# Patient Record
Sex: MALE | Race: WHITE | NOT HISPANIC OR LATINO | Employment: FULL TIME | URBAN - METROPOLITAN AREA
[De-identification: names, ages, dates, MRNs, and addresses within clinical notes are randomized per-mention and may not be internally consistent; named-entity substitution may affect disease eponyms.]

---

## 2017-08-03 ENCOUNTER — NON-PROVIDER VISIT (OUTPATIENT)
Dept: URGENT CARE | Facility: CLINIC | Age: 27
End: 2017-08-03

## 2017-08-03 DIAGNOSIS — Z02.83 ENCOUNTER FOR EMPLOYMENT-RELATED DRUG TESTING: ICD-10-CM

## 2017-08-03 LAB
BREATH ALCOHOL COMMENT: 0.03
POC BREATHALIZER: NORMAL PERCENT (ref 0–0.01)

## 2017-08-03 PROCEDURE — 82075 ASSAY OF BREATH ETHANOL: CPT | Performed by: NURSE PRACTITIONER

## 2017-08-03 NOTE — MR AVS SNAPSHOT
Simone Euceda   8/3/2017 10:45 AM   Appointment   MRN: [id number]    Department:  [facility]   Dept Phone:  [phone]    Description:  Male : 1990   Provider:  [facility]           Allergies as of 8/3/2017     Allergen Noted Reactions    Augmentin 2009       erythemia multiform      Vital Signs     Smoking Status                   Former Smoker           Basic Information     Date Of Birth Sex Race Ethnicity Preferred Language    1990 Male White Non- English      Problem List              ICD-10-CM Priority Class Noted - Resolved    Pre-hypertension R03.0   2014 - Present    Smoking F17.200   2014 - Present    Hemoptysis R04.2   2015 - Present    Tobacco use Z72.0   2015 - Present    ETOH abuse F10.10   2015 - Present    Pneumonia due to organism J18.9   2015 - Present    Bleeding of the respiratory tract R04.9   10/6/2015 - Present      Health Maintenance        Date Due Completion Dates    IMM DTaP/Tdap/Td Vaccine (1 - Tdap) 4/3/2008 ---    IMM PNEUMOCOCCAL 19-64 (ADULT) MEDIUM RISK SERIES (1 of 1 - PPSV23) 4/3/2008 ---    IMM INFLUENZA (1) 2017 ---            Current Immunizations     No immunizations on file.      Below and/or attached are the medications your provider expects you to take. Review all of your home medications and newly ordered medications with your provider and/or pharmacist. Follow medication instructions as directed by your provider and/or pharmacist. Please keep your medication list with you and share with your provider. Update the information when medications are discontinued, doses are changed, or new medications (including over-the-counter products) are added; and carry medication information at all times in the event of emergency situations     Allergies:  AUGMENTIN - (reactions not documented)               Medications  Valid as of: 2017 - 12:21 PM    Generic Name Brand Name Tablet Size  Instructions for use    LevoFLOXacin (Tab) LEVAQUIN 750 MG Take 1 Tab by mouth every day.        .                 Medicines prescribed today were sent to:     [id number] [place], [place] - [address] [facility]    [address] [place] [address] [place] [address]    Phone: [phone] Fax: [phone]    Open 24 Hours?: No      Medication refill instructions:       If your prescription bottle indicates you have medication refills left, it is not necessary to call your provider’s office. Please contact your pharmacy and they will refill your medication.    If your prescription bottle indicates you do not have any refills left, you may request refills at any time through one of the following ways: The online [employer] system (except Urgent Care), by calling your provider’s office, or by asking your pharmacy to contact your provider’s office with a refill request. Medication refills are processed only during regular business hours and may not be available until the next business day. Your provider may request additional information or to have a follow-up visit with you prior to refilling your medication.   *Please Note: Medication refills are assigned a new Rx number when refilled electronically. Your pharmacy may indicate that no refills were authorized even though a new prescription for the same medication is available at the pharmacy. Please request the medicine by name with the pharmacy before contacting your provider for a refill.           [employer] Access Code: [email or web address]  Expires: 9/2/2017 12:21 PM    [employer]  A secure, online tool to manage your health information     [employer]® is a secure, online tool that connects you to your personalized health information from the privacy of your home -- day or night - making it very easy for you to manage your healthcare. Once the activation process is completed, you can even access your medical information using the [employer] angel, which is available for free in the Apple  Leanna store or Google Play store.     [employer] provides the following levels of access (as shown below):   My Chart Features   [facility] Doctor [facility]  Specialists [facility]  Urgent  Care Non-[facility]  Primary Care  Doctor   Email your healthcare team securely and privately 24/7 X X X    Manage appointments: schedule your next appointment; view details of past/upcoming appointments X      Request prescription refills. X      View recent personal medical records, including lab and immunizations X X X X   View health record, including health history, allergies, medications X X X X   Read reports about your outpatient visits, procedures, consult and ER notes X X X X   See your discharge summary, which is a recap of your hospital and/or ER visit that includes your diagnosis, lab results, and care plan. X X       How to register for [employer]:  1. Go to  [email or web address].  2. Click on the Sign Up Now box, which takes you to the New Member Sign Up page. You will need to provide the following information:  a. Enter your [employer] Access Code exactly as it appears at the top of this page. (You will not need to use this code after you’ve completed the sign-up process. If you do not sign up before the expiration date, you must request a new code.)   b. Enter your date of birth.   c. Enter your home email address.   d. Click Submit, and follow the next screen’s instructions.  3. Create a [employer] ID. This will be your [employer] login ID and cannot be changed, so think of one that is secure and easy to remember.  4. Create a [employer] password. You can change your password at any time.  5. Enter your Password Reset Question and Answer. This can be used at a later time if you forget your password.   6. Enter your e-mail address. This allows you to receive e-mail notifications when new information is available in [employer].  7. Click Sign Up. You can now view your health information.    For assistance activating your [employer]  account, call [phone]

## 2017-08-18 ENCOUNTER — NON-PROVIDER VISIT (OUTPATIENT)
Dept: URGENT CARE | Facility: CLINIC | Age: 27
End: 2017-08-18

## 2017-08-18 DIAGNOSIS — Z02.1 PRE-EMPLOYMENT DRUG TESTING: ICD-10-CM

## 2017-08-18 LAB
AMP AMPHETAMINE: NORMAL
COC COCAINE: NORMAL
INT CON NEG: NEGATIVE
INT CON POS: POSITIVE
OPI OPIATES: NORMAL
PCP PHENCYCLIDINE: NORMAL
POC DRUG COMMENT 753798-OCCUPATIONAL HEALTH: NORMAL
THC: NORMAL

## 2017-08-18 PROCEDURE — 80305 DRUG TEST PRSMV DIR OPT OBS: CPT | Performed by: PHYSICIAN ASSISTANT

## 2017-09-22 ENCOUNTER — OFFICE VISIT (OUTPATIENT)
Dept: URGENT CARE | Facility: CLINIC | Age: 27
End: 2017-09-22
Payer: COMMERCIAL

## 2017-09-22 VITALS
TEMPERATURE: 98.5 F | DIASTOLIC BLOOD PRESSURE: 96 MMHG | BODY MASS INDEX: 23.48 KG/M2 | OXYGEN SATURATION: 98 % | WEIGHT: 164 LBS | HEIGHT: 70 IN | SYSTOLIC BLOOD PRESSURE: 122 MMHG | RESPIRATION RATE: 16 BRPM | HEART RATE: 126 BPM

## 2017-09-22 DIAGNOSIS — M10.9 ACUTE GOUT INVOLVING TOE OF RIGHT FOOT, UNSPECIFIED CAUSE: ICD-10-CM

## 2017-09-22 PROCEDURE — 99203 OFFICE O/P NEW LOW 30 MIN: CPT | Performed by: PHYSICIAN ASSISTANT

## 2017-09-22 RX ORDER — COLCHICINE 0.6 MG/1
0.6 TABLET ORAL 2 TIMES DAILY
Qty: 30 TAB | Refills: 0 | Status: SHIPPED | OUTPATIENT
Start: 2017-09-22 | End: 2018-02-15

## 2017-09-22 RX ORDER — INDOMETHACIN 50 MG/1
50 CAPSULE ORAL 3 TIMES DAILY
Qty: 60 CAP | Refills: 0 | Status: SHIPPED | OUTPATIENT
Start: 2017-09-22 | End: 2018-02-15

## 2017-09-22 ASSESSMENT — ENCOUNTER SYMPTOMS
TINGLING: 0
DIARRHEA: 0
VOMITING: 0
SENSORY CHANGE: 0
JOINT SWELLING: 1
FEVER: 0
CHILLS: 0
ABDOMINAL PAIN: 0
NAUSEA: 0
CARDIOVASCULAR NEGATIVE: 1
RESPIRATORY NEGATIVE: 1
FALLS: 0

## 2017-09-22 NOTE — LETTER
September 22, 2017         Patient: Simone Euceda   YOB: 1990   Date of Visit: 9/22/2017           To Whom it May Concern:    Simone Euceda was seen in my clinic on 9/22/2017. Please excuse from work today.    If you have any questions or concerns, please don't hesitate to call.        Sincerely,           Colt Chen P.A.-C.  Electronically Signed

## 2017-09-22 NOTE — PATIENT INSTRUCTIONS
Gout  Gout is an inflammatory arthritis caused by a buildup of uric acid crystals in the joints. Uric acid is a chemical that is normally present in the blood. When the level of uric acid in the blood is too high it can form crystals that deposit in your joints and tissues. This causes joint redness, soreness, and swelling (inflammation). Repeat attacks are common. Over time, uric acid crystals can form into masses (tophi) near a joint, destroying bone and causing disfigurement. Gout is treatable and often preventable.  CAUSES   The disease begins with elevated levels of uric acid in the blood. Uric acid is produced by your body when it breaks down a naturally found substance called purines. Certain foods you eat, such as meats and fish, contain high amounts of purines. Causes of an elevated uric acid level include:  · Being passed down from parent to child (heredity).  · Diseases that cause increased uric acid production (such as obesity, psoriasis, and certain cancers).  · Excessive alcohol use.  · Diet, especially diets rich in meat and seafood.  · Medicines, including certain cancer-fighting medicines (chemotherapy), water pills (diuretics), and aspirin.  · Chronic kidney disease. The kidneys are no longer able to remove uric acid well.  · Problems with metabolism.  Conditions strongly associated with gout include:  · Obesity.  · High blood pressure.  · High cholesterol.  · Diabetes.  Not everyone with elevated uric acid levels gets gout. It is not understood why some people get gout and others do not. Surgery, joint injury, and eating too much of certain foods are some of the factors that can lead to gout attacks.  SYMPTOMS   · An attack of gout comes on quickly. It causes intense pain with redness, swelling, and warmth in a joint.  · Fever can occur.  · Often, only one joint is involved. Certain joints are more commonly involved:  ¨ Base of the big toe.  ¨ Knee.  ¨ Ankle.  ¨ Wrist.  ¨ Finger.  Without  treatment, an attack usually goes away in a few days to weeks. Between attacks, you usually will not have symptoms, which is different from many other forms of arthritis.  DIAGNOSIS   Your caregiver will suspect gout based on your symptoms and exam. In some cases, tests may be recommended. The tests may include:  · Blood tests.  · Urine tests.  · X-rays.  · Joint fluid exam. This exam requires a needle to remove fluid from the joint (arthrocentesis). Using a microscope, gout is confirmed when uric acid crystals are seen in the joint fluid.  TREATMENT   There are two phases to gout treatment: treating the sudden onset (acute) attack and preventing attacks (prophylaxis).  · Treatment of an Acute Attack.  ¨ Medicines are used. These include anti-inflammatory medicines or steroid medicines.  ¨ An injection of steroid medicine into the affected joint is sometimes necessary.  ¨ The painful joint is rested. Movement can worsen the arthritis.  ¨ You may use warm or cold treatments on painful joints, depending which works best for you.  · Treatment to Prevent Attacks.  ¨ If you suffer from frequent gout attacks, your caregiver may advise preventive medicine. These medicines are started after the acute attack subsides. These medicines either help your kidneys eliminate uric acid from your body or decrease your uric acid production. You may need to stay on these medicines for a very long time.  ¨ The early phase of treatment with preventive medicine can be associated with an increase in acute gout attacks. For this reason, during the first few months of treatment, your caregiver may also advise you to take medicines usually used for acute gout treatment. Be sure you understand your caregiver's directions. Your caregiver may make several adjustments to your medicine dose before these medicines are effective.  ¨ Discuss dietary treatment with your caregiver or dietitian. Alcohol and drinks high in sugar and fructose and foods  such as meat, poultry, and seafood can increase uric acid levels. Your caregiver or dietitian can advise you on drinks and foods that should be limited.  HOME CARE INSTRUCTIONS   · Do not take aspirin to relieve pain. This raises uric acid levels.  · Only take over-the-counter or prescription medicines for pain, discomfort, or fever as directed by your caregiver.  · Rest the joint as much as possible. When in bed, keep sheets and blankets off painful areas.  · Keep the affected joint raised (elevated).  · Apply warm or cold treatments to painful joints. Use of warm or cold treatments depends on which works best for you.  · Use crutches if the painful joint is in your leg.  · Drink enough fluids to keep your urine clear or pale yellow. This helps your body get rid of uric acid. Limit alcohol, sugary drinks, and fructose drinks.  · Follow your dietary instructions. Pay careful attention to the amount of protein you eat. Your daily diet should emphasize fruits, vegetables, whole grains, and fat-free or low-fat milk products. Discuss the use of coffee, vitamin C, and cherries with your caregiver or dietitian. These may be helpful in lowering uric acid levels.  · Maintain a healthy body weight.  SEEK MEDICAL CARE IF:   · You develop diarrhea, vomiting, or any side effects from medicines.  · You do not feel better in 24 hours, or you are getting worse.  SEEK IMMEDIATE MEDICAL CARE IF:   · Your joint becomes suddenly more tender, and you have chills or a fever.  MAKE SURE YOU:   · Understand these instructions.  · Will watch your condition.  · Will get help right away if you are not doing well or get worse.     This information is not intended to replace advice given to you by your health care provider. Make sure you discuss any questions you have with your health care provider.     Document Released: 12/15/2001 Document Revised: 01/08/2016 Document Reviewed: 07/31/2013  [employer] Interactive Patient Education ©2016  [employer] Inc.

## 2017-09-22 NOTE — PROGRESS NOTES
Subjective:      Simone Euceda is a 27 y.o. male who presents with Foot Pain (x 4 days, Rt. foot pain, little redness and swelling)            Pain great toe right foot. Erythema, tenderness per likely gout.      Foot Problem   This is a new problem. The current episode started in the past 7 days. The problem occurs constantly. The problem has been gradually worsening. Associated symptoms include joint swelling (great toe right foot). Pertinent negatives include no abdominal pain, chills, fever, nausea or vomiting. The symptoms are aggravated by walking (pressure). He has tried NSAIDs for the symptoms. The treatment provided mild relief.    Review of Systems   Constitutional: Negative for chills and fever.   HENT: Negative.    Respiratory: Negative.    Cardiovascular: Negative.    Gastrointestinal: Negative for abdominal pain, diarrhea, nausea and vomiting.   Musculoskeletal: Positive for joint pain and joint swelling (great toe right foot). Negative for falls.        Right foot pain   Neurological: Negative for tingling and sensory change.     PMH:  has a past medical history of Alcohol use; Coughing blood; Hypertension; and Unspecified hemorrhagic conditions. He also has no past medical history of Clotting disorder (CMS-[facility]).  MEDS:   Current Outpatient Prescriptions:   •  colchicine (COLCRYS) 0.6 MG Tab, Take 1 Tab by mouth 2 times a day., Disp: 30 Tab, Rfl: 0  •  indomethacin (INDOCIN) 50 MG Cap, Take 1 Cap by mouth 3 times a day., Disp: 60 Cap, Rfl: 0  •  levofloxacin (LEVAQUIN) 750 MG tablet, Take 1 Tab by mouth every day., Disp: 7 Tab, Rfl: 0  ALLERGIES:   Allergies   Allergen Reactions   • Augmentin      erythemia multiform     SURGHX:   Past Surgical History:   Procedure Laterality Date   • BRONCHOSCOPY-ENDO N/A 10/6/2015    Procedure: BRONCHOSCOPY-ENDO;  Surgeon: Inder Cardenas M.D.;  Location: SURGERY [facility];  Service:    • LYMPH NODE EXCISION Right 2003    right lymph node removal  "2003, benign enlarged lymph node     SOCHX:  reports that he quit smoking about 2 years ago. His smoking use included Cigarettes. He has a 1.50 pack-year smoking history. He has never used smokeless tobacco. He reports that he drinks alcohol. He reports that he does not use drugs.  FH: family history includes Cancer in his maternal grandfather and mother; Hypertension in his mother.    Medications, Allergies, and current problem list reviewed today in Epic       Objective:     /96   Pulse (!) 126   Temp 36.9 °C (98.5 °F)   Resp 16   Ht 1.778 m (5' 10\")   Wt 74.4 kg (164 lb)   SpO2 98%   BMI 23.53 kg/m²      Physical Exam   Constitutional: He is oriented to person, place, and time. He appears well-developed and well-nourished. No distress.   HENT:   Head: Normocephalic and atraumatic.   Eyes: Conjunctivae and EOM are normal.   Neck: Normal range of motion. Neck supple.   Cardiovascular: Normal rate, regular rhythm and normal heart sounds.    No murmur heard.  Pulmonary/Chest: Effort normal and breath sounds normal. No respiratory distress. He has no wheezes.   Musculoskeletal:        Right foot: There is tenderness, bony tenderness and swelling. There is normal range of motion and no deformity.        Feet:    Range of motion normal, distal neurovascular intact.   Neurological: He is alert and oriented to person, place, and time.   Skin: Skin is warm and dry. He is not diaphoretic.   Psychiatric: He has a normal mood and affect. His behavior is normal. Judgment and thought content normal.   Nursing note and vitals reviewed.              Assessment/Plan:     1. Acute gout involving toe of right foot, unspecified cause  colchicine (COLCRYS) 0.6 MG Tab    indomethacin (INDOCIN) 50 MG Cap     Symptoms and presentation consistent with gout. No signs of infection or musculoskeletal injury.  Colchicine and indomethacin as directed  Dietary restrictions discussed, at home measures discussed, handout " given  Increase fluid intake and cherry juice  Return to clinic or go to ED if symptoms worsen or persist. Indications for ED discussed at length. Patient voices understanding. Follow-up with your primary care provider in 3-5 days. Red flags discussed.    Please note that this dictation was created using voice recognition software. I have made every reasonable attempt to correct obvious errors, but I expect that there are errors of grammar and possibly content that I did not discover before finalizing the note.

## 2018-02-15 ENCOUNTER — OFFICE VISIT (OUTPATIENT)
Dept: MEDICAL GROUP | Facility: MEDICAL CENTER | Age: 28
End: 2018-02-15
Payer: COMMERCIAL

## 2018-02-15 VITALS
BODY MASS INDEX: 22.69 KG/M2 | SYSTOLIC BLOOD PRESSURE: 126 MMHG | OXYGEN SATURATION: 96 % | TEMPERATURE: 99.5 F | HEIGHT: 70 IN | WEIGHT: 158.51 LBS | DIASTOLIC BLOOD PRESSURE: 90 MMHG | HEART RATE: 100 BPM | RESPIRATION RATE: 16 BRPM

## 2018-02-15 DIAGNOSIS — Z72.0 TOBACCO USE: ICD-10-CM

## 2018-02-15 DIAGNOSIS — J18.9 PNEUMONIA DUE TO ORGANISM: ICD-10-CM

## 2018-02-15 DIAGNOSIS — R03.0 PRE-HYPERTENSION: ICD-10-CM

## 2018-02-15 DIAGNOSIS — M1A.0710 CHRONIC IDIOPATHIC GOUT INVOLVING TOE OF RIGHT FOOT WITHOUT TOPHUS: ICD-10-CM

## 2018-02-15 DIAGNOSIS — F10.10 ETOH ABUSE: ICD-10-CM

## 2018-02-15 PROCEDURE — 99214 OFFICE O/P EST MOD 30 MIN: CPT | Performed by: FAMILY MEDICINE

## 2018-02-15 RX ORDER — COLCHICINE 0.6 MG/1
0.6 TABLET ORAL DAILY
Qty: 30 TAB | Refills: 11 | Status: SHIPPED | OUTPATIENT
Start: 2018-02-15

## 2018-02-15 ASSESSMENT — PATIENT HEALTH QUESTIONNAIRE - PHQ9: CLINICAL INTERPRETATION OF PHQ2 SCORE: 0

## 2018-02-15 NOTE — ASSESSMENT & PLAN NOTE
This is a chronic health problems for this patient has gone on for approximately 8 years. Patient states that he drinks approximately equivalent to 4 alcoholic drinks daily. He was recently hospitalized with pneumonia and was told to follow up on liver functions. He believes they were elevated. Unfortunately he was in the ICU and intubated therefore sedated through part of this hospitalization. We'll have to get the records to find out what further follow-up we need to do for him. Patient states that he drinks because of stress.

## 2018-02-15 NOTE — ASSESSMENT & PLAN NOTE
This is a chronic health problems for this patient. He just quit smoking when he got hospitalized on February 1, 2018. He was hospitalized for what he was told was pneumonia. The reason he went in was because he had hemoptysis. I do not have those records at this time. We have requested them.

## 2018-02-15 NOTE — ASSESSMENT & PLAN NOTE
This is a chronic problem for this patient that most likely is related to his alcohol use. Patient had one severe attack where he was seen in the urgent care. He tells me that the medications combined were over $100. He has a few colchicine tablets left. I'm going to send a prescription for colchicine for #30 if he can afford it he will purchase it and take one a day to prevent another attack. If he cannot afford the medication he will leave the prescription there and use it if he has an attack. He knows that cutting down on alcohol will cut down on the gout.

## 2018-02-15 NOTE — ASSESSMENT & PLAN NOTE
This patient was just hospitalized at [facility] from February 1 through February 8. He was critically ill and in the ICU for 5 days. He was on a ventilator during that time. He does not know everything that went on during that time. I do not have records. We are trying to get the records as fast as we can. We will have to figure out follow-up based on this. He previously had pneumonia in October 2016. Patient had continued to smoke but has now quit with this recent hospitalization.

## 2018-02-15 NOTE — ASSESSMENT & PLAN NOTE
This is a chronic health problem for this patient when he was last seen in September 2017 his blood pressure was 122/96 but he was acutely ill at that time. Now his blood pressure is 126/90 and he just coming off of another acute illness where he was hospitalized at [place]. We will continue to follow his blood pressure. If his diastolic continues to be above 90 we will start medication.

## 2018-02-15 NOTE — PROGRESS NOTES
CC: Hospital follow-up from pneumonia admission at [facility]    HISTORY OF THE PRESENT ILLNESS: Patient is a 27 y.o. male. This pleasant patient is here today to establish care after not having a primary care doctor for approximately 3 years. Patient has been obtaining his healthcare through the urgent cares. Because of this recent hospitalization he realizes he needs to have a regular primary care physician and is here today to do that. Patient was hospitalized at [facility] from 2/1/18 through 2/8/18. He tells me that during that time he was in the intensive care unit on a ventilator for pneumonia. He has multiple other chronic health problems that need to be addressed. I do not have the records from [facility] as of yet so we will need to contact the patient after we get those records and I can review them    Health Maintenance: Completed      Tobacco use  This is a chronic health problems for this patient. He just quit smoking when he got hospitalized on February 1, 2018. He was hospitalized for what he was told was pneumonia. The reason he went in was because he had hemoptysis. I do not have those records at this time. We have requested them.    ETOH abuse  This is a chronic health problems for this patient has gone on for approximately 8 years. Patient states that he drinks approximately equivalent to 4 alcoholic drinks daily. He was recently hospitalized with pneumonia and was told to follow up on liver functions. He believes they were elevated. Unfortunately he was in the ICU and intubated therefore sedated through part of this hospitalization. We'll have to get the records to find out what further follow-up we need to do for him. Patient states that he drinks because of stress.    Pneumonia due to organism  This patient was just hospitalized at [facility] from February 1 through February 8. He was critically ill and in the ICU for 5 days. He was on a ventilator during that time. He does not know everything  "that went on during that time. I do not have records. We are trying to get the records as fast as we can. We will have to figure out follow-up based on this. He previously had pneumonia in October 2016. Patient had continued to smoke but has now quit with this recent hospitalization.    Pre-hypertension  This is a chronic health problem for this patient when he was last seen in September 2017 his blood pressure was 122/96 but he was acutely ill at that time. Now his blood pressure is 126/90 and he just coming off of another acute illness where he was hospitalized at [place]. We will continue to follow his blood pressure. If his diastolic continues to be above 90 we will start medication.    Chronic idiopathic gout involving toe of right foot without tophus  This is a chronic problem for this patient that most likely is related to his alcohol use. Patient had one severe attack where he was seen in the urgent care. He tells me that the medications combined were over $100. He has a few colchicine tablets left. I'm going to send a prescription for colchicine for #30 if he can afford it he will purchase it and take one a day to prevent another attack. If he cannot afford the medication he will leave the prescription there and use it if he has an attack. He knows that cutting down on alcohol will cut down on the gout.    Allergies: Augmentin    Current Outpatient Prescriptions Ordered in [facility]   Medication Sig Dispense Refill   • colchicine (COLCRYS) 0.6 MG Tab Take 1 Tab by mouth every day. 30 Tab 11     No current [facility]-ordered facility-administered medications on file.        Past Medical History:   Diagnosis Date   • Alcohol use     21/week \"only beers\"   • Coughing blood     since 9/19/15, hospitalized at [facility] x 3 days   • Hypertension     no tx   • Unspecified hemorrhagic conditions        Past Surgical History:   Procedure Laterality Date   • BRONCHOSCOPY-ENDO N/A 10/6/2015    Procedure: BRONCHOSCOPY-ENDO;  " Surgeon: Inder Cardenas M.D.;  Location: SURGERY [facility];  Service:    • LYMPH NODE EXCISION Right 2003    right lymph node removal 2003, benign enlarged lymph node       Social History   Substance Use Topics   • Smoking status: Former Smoker     Packs/day: 0.50     Years: 8.00     Types: Cigarettes     Quit date: 2/1/2018   • Smokeless tobacco: Current User     Types: Chew   • Alcohol use 16.8 oz/week     28 Standard drinks or equivalent per week       Social History     Social History Narrative   • No narrative on file       Family History   Problem Relation Age of Onset   • Cancer Mother      bone   • Prostate cancer Maternal Grandfather    • Hypertension Father    • Hyperlipidemia Father    • Stroke     • No Known Problems Sister    • Cancer Brother      leukemia       ROS:     - Constitutional: Positive for continued fatigue at this time denies fever, chills, or unexpected weight change.     - HEENT: Negative for headaches, vision changes, hearing changes, ear pain, ear discharge, rhinorrhea, sinus congestion, sore throat, and neck pain.      - Respiratory: Negative for cough, sputum production, chest congestion, dyspnea, wheezing, and crackles.      - Cardiovascular: Negative for chest pain, palpitations, orthopnea, and bilateral lower extremity edema.     - Gastrointestinal: Negative for heartburn, nausea, vomiting, abdominal pain, hematochezia, melena, diarrhea, constipation, and greasy/foul-smelling stools.     - Genitourinary: Negative for dysuria, polyuria, hematuria, pyuria, urinary urgency, and urinary incontinence.     - Musculoskeletal: Negative for myalgias, back pain, and joint pain.     - Skin: Negative for rash, itching, cyanotic skin color change.     - Neurological: Negative for dizziness, tingling, tremors, focal sensory deficit, focal weakness and headaches.     - Endo/Heme/Allergies: Patient occasionally gets gout on his right foot at the base of the great toe.      -  "Psychiatric/Behavioral: Negative for depression, suicidal/homicidal ideation and memory loss.        - NOTE: All other systems reviewed and are negative, except as in HPI.      .      Exam: Blood pressure 126/90, pulse 100, temperature 37.5 °C (99.5 °F), resp. rate 16, height 1.778 m (5' 10\"), weight 71.9 kg (158 lb 8.2 oz), SpO2 96 %. Body mass index is 22.74 kg/m².    General: thin, Normal appearing. No distress.  HEENT: Normocephalic. Eyes conjunctiva clear lids without ptosis, pupils equal and reactive to light accommodation, ears normal shape and contour, canals are clear bilaterally, tympanic membranes are benign, nasal mucosa benign, oropharynx is without erythema, edema or exudates.   Neck: Supple without JVD or bruit. Thyroid is not enlarged.  Pulmonary: Clear to ausculation.  Normal effort. No rales, ronchi, or wheezing.  Cardiovascular: Regular rate and rhythm without murmur. Carotid and radial pulses are intact and equal bilaterally.  Abdomen: Soft, nontender, nondistended. Normal bowel sounds. Liver and spleen are not palpable  Neurologic: Grossly nonfocal  Lymph: No cervical, supraclavicular or axillary lymph nodes are palpable  Skin: Warm and dry.  No obvious lesions.  Musculoskeletal: Normal gait. No extremity cyanosis, clubbing, or edema.  Psych: Normal mood and affect. Alert and oriented x3. Judgment and insight is mildly impaired, patient is anxious.    Please note that this dictation was created using voice recognition software. I have made every reasonable attempt to correct obvious errors, but I expect that there are errors of grammar and possibly content that I did not discover before finalizing the note.      Assessment/Plan  1. Tobacco use  Previously uncontrolled the patient states that he quit smoking as of 2/1/18 and does not plan to go back to this. I highly encouraged him to continue with his efforts to not smoke.    2. ETOH abuse  Uncontrolled, patient is utilizing alcohol because of " stressors in his life which she describes as financial stressors. We discussed other options. At this time he is not ready to give up alcohol    3. Pneumonia due to organism  Controlled, patient has been discharged from the hospital and he is here today to establish care so that he does not have to go to the hospital for his care. He has [place] paperwork with him but because he was hospitalized from 2/1-2/8 and went back to work on 2/12--and I am seeing him on 2/15/18 I would not be the appropriate person to complete that paperwork. I requested that he go talk to the hospitalist who took care of him while he was at [place].    4. Pre-hypertension  Uncontrolled, patient's diastolic number is elevated. Hopefully with his stopping smoking we will see some improvement. I've also encouraged him to quit drinking which should help. We will plan to see him back in about 6 weeks.    5. Chronic idiopathic gout involving toe of right foot without tophus  Intermittently uncontrolled. Patient's not currently having a gout flare but would like to have a prescription for colchicine so that if he does have a flare he would have that medication available to him.    This patient tells me that he was told to follow-up on elevated liver functions which possibly are related to his alcohol use. I need to get his records to see what studies have artery been done. I will then contact the patient via email so that he can get the studies done before he returns.

## 2018-03-29 ENCOUNTER — APPOINTMENT (OUTPATIENT)
Dept: MEDICAL GROUP | Facility: MEDICAL CENTER | Age: 28
End: 2018-03-29
Payer: COMMERCIAL

## 2018-09-18 ENCOUNTER — OFFICE VISIT (OUTPATIENT)
Dept: MEDICAL GROUP | Facility: MEDICAL CENTER | Age: 28
End: 2018-09-18
Payer: COMMERCIAL

## 2018-09-18 VITALS
BODY MASS INDEX: 21.73 KG/M2 | SYSTOLIC BLOOD PRESSURE: 130 MMHG | OXYGEN SATURATION: 97 % | WEIGHT: 151.8 LBS | HEIGHT: 70 IN | HEART RATE: 91 BPM | DIASTOLIC BLOOD PRESSURE: 82 MMHG | TEMPERATURE: 99.1 F | RESPIRATION RATE: 12 BRPM

## 2018-09-18 DIAGNOSIS — M1A.0710 CHRONIC IDIOPATHIC GOUT INVOLVING TOE OF RIGHT FOOT WITHOUT TOPHUS: ICD-10-CM

## 2018-09-18 DIAGNOSIS — R03.0 PRE-HYPERTENSION: ICD-10-CM

## 2018-09-18 DIAGNOSIS — F10.10 ETOH ABUSE: ICD-10-CM

## 2018-09-18 PROCEDURE — 99214 OFFICE O/P EST MOD 30 MIN: CPT | Performed by: FAMILY MEDICINE

## 2018-09-18 RX ORDER — ALLOPURINOL 300 MG/1
300 TABLET ORAL DAILY
Qty: 90 TAB | Refills: 3 | Status: SHIPPED | OUTPATIENT
Start: 2018-09-18

## 2018-09-18 NOTE — PROGRESS NOTES
CC:Diagnoses of ETOH abuse, Chronic idiopathic gout involving toe of right foot without tophus, and Pre-hypertension were pertinent to this visit.    HISTORY OF PRESENT ILLNESS: Patient is a 28 y.o. male established patient who presents today to follow-up on his alcohol abuse, gout and pre-hypertension.  Patient admits that he continues to drink 4-6 beers per day.  He currently already has had 3 beers and it is only 3:30 in the afternoon.  He wants to be placed on the medication that will stop his craving for alcohol.  I explained to him that the medications that he would use for that he has to already be abstinent from alcohol for at least 14 days before I can put him on the medication.    Health Maintenance: Completed    ETOH abuse  This is a chronic health problem for this patient where he is drinking approximately 4 beers a day.  It has started because a problem for his girlfriend who feels that he needs to be in some type of therapy and suggested that he come discuss getting on medications to try and take away his craving for alcohol.  We discussed the fact that he has to be abstinent from alcohol before we can put him on those medications.  He has never done any intensive outpatient therapy.  He would definitely benefit from this.  He has tried AA in the past as well as counseling.  He is willing to do this if he could do it around his work schedule.    Chronic idiopathic gout involving toe of right foot without tophus  This is a chronic health problem for this patient directly related to alcohol intake.  Patient currently trying to get into rehab.  At this point we could put him on allopurinol to try and prevent further gout attacks.  He just had a flare 2 days ago and after 1 tablet of colchicine he actually did better.  I recommended that we consider allopurinol having him take that on a daily basis along with the colchicine for the first 14 days then he could drop the colchicine and beyond just the  allopurinol which should prevent further gout attacks.    Pre-hypertension  This is been a problem chronically for the patient in the past.  Typically related to the level of alcohol use he has had prior to coming into the office.  Today he has had 3 beers and his blood pressure is elevated at 130/82.  First thing in the morning his blood pressure is actually much better.      Patient Active Problem List    Diagnosis Date Noted   • Chronic idiopathic gout involving toe of right foot without tophus 02/15/2018   • Pneumonia due to organism 09/20/2015   • Tobacco use 09/19/2015   • ETOH abuse 09/19/2015   • Pre-hypertension 05/29/2014      Allergies:Augmentin    Current Outpatient Prescriptions   Medication Sig Dispense Refill   • allopurinol (ZYLOPRIM) 300 MG Tab Take 1 Tab by mouth every day. 90 Tab 3   • colchicine (COLCRYS) 0.6 MG Tab Take 1 Tab by mouth every day. 30 Tab 11     No current facility-administered medications for this visit.        Social History   Substance Use Topics   • Smoking status: Current Every Day Smoker     Packs/day: 0.50     Years: 8.00     Types: Cigarettes   • Smokeless tobacco: Former User     Types: Chew     Quit date: 06/2018   • Alcohol use 19.2 - 19.8 oz/week     4 - 5 Cans of beer, 28 Standard drinks or equivalent per week     Social History     Social History Narrative   • No narrative on file       Family History   Problem Relation Age of Onset   • Cancer Mother         bone   • Prostate cancer Maternal Grandfather    • Hypertension Father    • Hyperlipidemia Father    • Stroke Unknown    • No Known Problems Sister    • Cancer Brother         leukemia        ROS:     - Constitutional:  Negative for fever, chills, unexpected weight change, and fatigue/generalized weakness.    - HEENT:  Negative for headaches, vision changes, hearing changes, ear pain, ear discharge, rhinorrhea, sinus congestion, sore throat, and neck pain.      - Respiratory: Negative for cough, sputum production,  "chest congestion, dyspnea, wheezing, and crackles.      - Cardiovascular: Negative for chest pain, palpitations, orthopnea, and bilateral lower extremity edema.     - Gastrointestinal: Negative for heartburn, nausea, vomiting, abdominal pain, hematochezia, melena, diarrhea, constipation, and greasy/foul-smelling stools.     - Genitourinary: Negative for dysuria, polyuria, hematuria, pyuria, urinary urgency, and urinary incontinence.     - Musculoskeletal: Positive for gout in his big toe bilaterally otherwise denies myalgias, back pain, and joint pain.     - Skin: Negative for rash, itching, cyanotic skin color change.     - Neurological: Negative for dizziness, tingling, tremors, focal sensory deficit, focal weakness and headaches.     - Endo/Heme/Allergies: Does not bruise/bleed easily.     - Psychiatric/Behavioral: Negative for depression, suicidal/homicidal ideation and memory loss.          - NOTE: All other systems reviewed and are negative, except as in HPI.      Exam:    Blood pressure 130/82, pulse 91, temperature 37.3 °C (99.1 °F), resp. rate 12, height 1.778 m (5' 10\"), weight 68.9 kg (151 lb 12.8 oz), SpO2 97 %. Body mass index is 21.78 kg/m².    General:  Well nourished, well developed male in NAD, patient's breath does smell of alcohol.  Head is grossly normal.  Neck: Supple without JVD or bruit. Thyroid is not enlarged.  Pulmonary: Clear to ausculation and percussion.  Normal effort. No rales, ronchi, or wheezing.  Cardiovascular: Regular rate and rhythm without murmur. Carotid and radial pulses are intact and equal bilaterally.  Extremities: no clubbing, cyanosis, or edema.  Patient was seen for 25 minutes face to face of which, 20 minutes was spent counseling regarding ways to be successful in putting his alcohol abuse into remission.  We discussed the possibility of seeing a counselor and going into intensive outpatient therapy.  Patient's here at the request of his girlfriend more than at his own " insistence which makes me concerned that he will not be successful..    Please note that this dictation was created using voice recognition software. I have made every reasonable attempt to correct obvious errors, but I expect that there are errors of grammar and possibly content that I did not discover before finalizing the note.    Assessment/Plan:  1. ETOH abuse  I have placed a referral for the patient to attend intensive outpatient therapy.  He must be abstinent and participating in a program before I will consider prescribing naltrexone.  Patient is understanding of my reluctance.  - REFERRAL TO BEHAVIORAL HEALTH    2. Chronic idiopathic gout involving toe of right foot without tophus  Uncontrolled, we will have the patient get a uric acid level and a comprehensive metabolic panel today.  We will start allopurinol 300 mg daily and he will take a colchicine daily for the first 14 days then stop colchicine and continue with allopurinol alone.  This should prevent further gout attacks.  - URIC ACID; Future  - COMP METABOLIC PANEL; Future    3. Pre-hypertension  Adequately controlled

## 2018-09-18 NOTE — ASSESSMENT & PLAN NOTE
This is a chronic health problem for this patient directly related to alcohol intake.  Patient currently trying to get into rehab.  At this point we could put him on allopurinol to try and prevent further gout attacks.  He just had a flare 2 days ago and after 1 tablet of colchicine he actually did better.  I recommended that we consider allopurinol having him take that on a daily basis along with the colchicine for the first 14 days then he could drop the colchicine and beyond just the allopurinol which should prevent further gout attacks.

## 2018-09-18 NOTE — ASSESSMENT & PLAN NOTE
This is a chronic health problem for this patient where he is drinking approximately 4 beers a day.  It has started because a problem for his girlfriend who feels that he needs to be in some type of therapy and suggested that he come discuss getting on medications to try and take away his craving for alcohol.  We discussed the fact that he has to be abstinent from alcohol before we can put him on those medications.  He has never done any intensive outpatient therapy.  He would definitely benefit from this.  He has tried AA in the past as well as counseling.  He is willing to do this if he could do it around his work schedule.

## 2018-09-18 NOTE — ASSESSMENT & PLAN NOTE
This is been a problem chronically for the patient in the past.  Typically related to the level of alcohol use he has had prior to coming into the office.  Today he has had 3 beers and his blood pressure is elevated at 130/82.  First thing in the morning his blood pressure is actually much better.

## 2018-09-21 DIAGNOSIS — M10.9 ACUTE GOUT INVOLVING TOE OF RIGHT FOOT, UNSPECIFIED CAUSE: ICD-10-CM

## 2018-09-21 RX ORDER — INDOMETHACIN 50 MG/1
50 CAPSULE ORAL 3 TIMES DAILY
Qty: 60 CAP | Refills: 0 | Status: SHIPPED | OUTPATIENT
Start: 2018-09-21

## 2019-11-27 ENCOUNTER — HOSPITAL ENCOUNTER (EMERGENCY)
Facility: MEDICAL CENTER | Age: 29
End: 2019-11-27
Attending: EMERGENCY MEDICINE
Payer: COMMERCIAL

## 2019-11-27 ENCOUNTER — NON-PROVIDER VISIT (OUTPATIENT)
Dept: OCCUPATIONAL MEDICINE | Facility: CLINIC | Age: 29
End: 2019-11-27
Payer: COMMERCIAL

## 2019-11-27 VITALS
TEMPERATURE: 98.5 F | HEIGHT: 70 IN | RESPIRATION RATE: 16 BRPM | SYSTOLIC BLOOD PRESSURE: 135 MMHG | DIASTOLIC BLOOD PRESSURE: 74 MMHG | BODY MASS INDEX: 25.77 KG/M2 | OXYGEN SATURATION: 98 % | HEART RATE: 89 BPM | WEIGHT: 180 LBS

## 2019-11-27 DIAGNOSIS — R55 SYNCOPE, UNSPECIFIED SYNCOPE TYPE: ICD-10-CM

## 2019-11-27 DIAGNOSIS — S01.01XA LACERATION OF SCALP, INITIAL ENCOUNTER: ICD-10-CM

## 2019-11-27 DIAGNOSIS — S09.90XA CLOSED HEAD INJURY, INITIAL ENCOUNTER: ICD-10-CM

## 2019-11-27 DIAGNOSIS — Z02.83 ENCOUNTER FOR DRUG SCREENING: ICD-10-CM

## 2019-11-27 DIAGNOSIS — F10.930 ALCOHOL WITHDRAWAL SYNDROME WITHOUT COMPLICATION (HCC): ICD-10-CM

## 2019-11-27 LAB
ALBUMIN SERPL BCP-MCNC: 5 G/DL (ref 3.2–4.9)
ALBUMIN/GLOB SERPL: 2.1 G/DL
ALP SERPL-CCNC: 96 U/L (ref 30–99)
ALT SERPL-CCNC: 91 U/L (ref 2–50)
ANION GAP SERPL CALC-SCNC: 18 MMOL/L (ref 0–11.9)
AST SERPL-CCNC: 174 U/L (ref 12–45)
BASOPHILS # BLD AUTO: 1.1 % (ref 0–1.8)
BASOPHILS # BLD: 0.08 K/UL (ref 0–0.12)
BILIRUB SERPL-MCNC: 2 MG/DL (ref 0.1–1.5)
BUN SERPL-MCNC: 7 MG/DL (ref 8–22)
CALCIUM SERPL-MCNC: 9.5 MG/DL (ref 8.5–10.5)
CHLORIDE SERPL-SCNC: 97 MMOL/L (ref 96–112)
CO2 SERPL-SCNC: 20 MMOL/L (ref 20–33)
CREAT SERPL-MCNC: 0.74 MG/DL (ref 0.5–1.4)
EKG IMPRESSION: NORMAL
EOSINOPHIL # BLD AUTO: 0 K/UL (ref 0–0.51)
EOSINOPHIL NFR BLD: 0 % (ref 0–6.9)
ERYTHROCYTE [DISTWIDTH] IN BLOOD BY AUTOMATED COUNT: 49.8 FL (ref 35.9–50)
ETHANOL BLD-MCNC: 0 G/DL
GLOBULIN SER CALC-MCNC: 2.4 G/DL (ref 1.9–3.5)
GLUCOSE SERPL-MCNC: 150 MG/DL (ref 65–99)
HCT VFR BLD AUTO: 47.4 % (ref 42–52)
HGB BLD-MCNC: 16.8 G/DL (ref 14–18)
IMM GRANULOCYTES # BLD AUTO: 0.04 K/UL (ref 0–0.11)
IMM GRANULOCYTES NFR BLD AUTO: 0.5 % (ref 0–0.9)
LIPASE SERPL-CCNC: 127 U/L (ref 11–82)
LYMPHOCYTES # BLD AUTO: 0.62 K/UL (ref 1–4.8)
LYMPHOCYTES NFR BLD: 8.5 % (ref 22–41)
MCH RBC QN AUTO: 35 PG (ref 27–33)
MCHC RBC AUTO-ENTMCNC: 35.4 G/DL (ref 33.7–35.3)
MCV RBC AUTO: 98.8 FL (ref 81.4–97.8)
MONOCYTES # BLD AUTO: 0.78 K/UL (ref 0–0.85)
MONOCYTES NFR BLD AUTO: 10.7 % (ref 0–13.4)
NEUTROPHILS # BLD AUTO: 5.79 K/UL (ref 1.82–7.42)
NEUTROPHILS NFR BLD: 79.2 % (ref 44–72)
NRBC # BLD AUTO: 0 K/UL
NRBC BLD-RTO: 0 /100 WBC
PLATELET # BLD AUTO: 76 K/UL (ref 164–446)
PMV BLD AUTO: 10.3 FL (ref 9–12.9)
POTASSIUM SERPL-SCNC: 4.3 MMOL/L (ref 3.6–5.5)
PROT SERPL-MCNC: 7.4 G/DL (ref 6–8.2)
RBC # BLD AUTO: 4.8 M/UL (ref 4.7–6.1)
SODIUM SERPL-SCNC: 135 MMOL/L (ref 135–145)
WBC # BLD AUTO: 7.3 K/UL (ref 4.8–10.8)

## 2019-11-27 PROCEDURE — 93005 ELECTROCARDIOGRAM TRACING: CPT | Performed by: EMERGENCY MEDICINE

## 2019-11-27 PROCEDURE — 85025 COMPLETE CBC W/AUTO DIFF WBC: CPT

## 2019-11-27 PROCEDURE — 93005 ELECTROCARDIOGRAM TRACING: CPT

## 2019-11-27 PROCEDURE — 700102 HCHG RX REV CODE 250 W/ 637 OVERRIDE(OP): Performed by: EMERGENCY MEDICINE

## 2019-11-27 PROCEDURE — 99285 EMERGENCY DEPT VISIT HI MDM: CPT

## 2019-11-27 PROCEDURE — 304999 HCHG REPAIR-SIMPLE/INTERMED LEVEL 1

## 2019-11-27 PROCEDURE — 304217 HCHG IRRIGATION SYSTEM

## 2019-11-27 PROCEDURE — A9270 NON-COVERED ITEM OR SERVICE: HCPCS | Performed by: EMERGENCY MEDICINE

## 2019-11-27 PROCEDURE — 80053 COMPREHEN METABOLIC PANEL: CPT

## 2019-11-27 PROCEDURE — 700111 HCHG RX REV CODE 636 W/ 250 OVERRIDE (IP): Performed by: EMERGENCY MEDICINE

## 2019-11-27 PROCEDURE — 700105 HCHG RX REV CODE 258: Performed by: EMERGENCY MEDICINE

## 2019-11-27 PROCEDURE — 80307 DRUG TEST PRSMV CHEM ANLYZR: CPT

## 2019-11-27 PROCEDURE — 83690 ASSAY OF LIPASE: CPT

## 2019-11-27 PROCEDURE — 96374 THER/PROPH/DIAG INJ IV PUSH: CPT

## 2019-11-27 PROCEDURE — 700101 HCHG RX REV CODE 250: Performed by: EMERGENCY MEDICINE

## 2019-11-27 PROCEDURE — 99026 IN-HOSPITAL ON CALL SERVICE: CPT | Performed by: PREVENTIVE MEDICINE

## 2019-11-27 PROCEDURE — 305308 HCHG STAPLER,SKIN,DISP.

## 2019-11-27 PROCEDURE — 96376 TX/PRO/DX INJ SAME DRUG ADON: CPT

## 2019-11-27 PROCEDURE — 80305 DRUG TEST PRSMV DIR OPT OBS: CPT | Performed by: PREVENTIVE MEDICINE

## 2019-11-27 PROCEDURE — 8911 PR MRO FEE: Performed by: PREVENTIVE MEDICINE

## 2019-11-27 PROCEDURE — 96375 TX/PRO/DX INJ NEW DRUG ADDON: CPT

## 2019-11-27 RX ORDER — PROMETHAZINE HYDROCHLORIDE 25 MG/1
25 TABLET ORAL ONCE
Status: COMPLETED | OUTPATIENT
Start: 2019-11-27 | End: 2019-11-27

## 2019-11-27 RX ORDER — LIDOCAINE HYDROCHLORIDE AND EPINEPHRINE 10; 10 MG/ML; UG/ML
10 INJECTION, SOLUTION INFILTRATION; PERINEURAL ONCE
Status: COMPLETED | OUTPATIENT
Start: 2019-11-27 | End: 2019-11-27

## 2019-11-27 RX ORDER — CHLORDIAZEPOXIDE HYDROCHLORIDE 25 MG/1
50 CAPSULE, GELATIN COATED ORAL ONCE
Status: COMPLETED | OUTPATIENT
Start: 2019-11-27 | End: 2019-11-27

## 2019-11-27 RX ORDER — LORAZEPAM 2 MG/ML
1 INJECTION INTRAMUSCULAR ONCE
Status: COMPLETED | OUTPATIENT
Start: 2019-11-27 | End: 2019-11-27

## 2019-11-27 RX ORDER — PROMETHAZINE HYDROCHLORIDE 25 MG/1
25 TABLET ORAL EVERY 6 HOURS PRN
Qty: 30 TAB | Refills: 0 | Status: SHIPPED | OUTPATIENT
Start: 2019-11-27

## 2019-11-27 RX ORDER — SODIUM CHLORIDE 9 MG/ML
1000 INJECTION, SOLUTION INTRAVENOUS ONCE
Status: COMPLETED | OUTPATIENT
Start: 2019-11-27 | End: 2019-11-27

## 2019-11-27 RX ORDER — ONDANSETRON 2 MG/ML
4 INJECTION INTRAMUSCULAR; INTRAVENOUS ONCE
Status: COMPLETED | OUTPATIENT
Start: 2019-11-27 | End: 2019-11-27

## 2019-11-27 RX ORDER — CHLORDIAZEPOXIDE HYDROCHLORIDE 25 MG/1
CAPSULE, GELATIN COATED ORAL
Qty: 15 CAP | Refills: 0 | Status: SHIPPED | OUTPATIENT
Start: 2019-11-27 | End: 2019-12-01

## 2019-11-27 RX ORDER — IBUPROFEN 600 MG/1
600 TABLET ORAL ONCE
Status: DISCONTINUED | OUTPATIENT
Start: 2019-11-27 | End: 2019-11-27 | Stop reason: HOSPADM

## 2019-11-27 RX ADMIN — LORAZEPAM 1 MG: 2 INJECTION INTRAMUSCULAR; INTRAVENOUS at 13:49

## 2019-11-27 RX ADMIN — SODIUM CHLORIDE 1000 ML: 9 INJECTION, SOLUTION INTRAVENOUS at 11:08

## 2019-11-27 RX ADMIN — LORAZEPAM 1 MG: 2 INJECTION INTRAMUSCULAR at 11:08

## 2019-11-27 RX ADMIN — PROMETHAZINE HYDROCHLORIDE 25 MG: 25 TABLET ORAL at 13:15

## 2019-11-27 RX ADMIN — CHLORDIAZEPOXIDE HYDROCHLORIDE 50 MG: 25 CAPSULE ORAL at 13:15

## 2019-11-27 RX ADMIN — ONDANSETRON 4 MG: 2 INJECTION INTRAMUSCULAR; INTRAVENOUS at 13:49

## 2019-11-27 RX ADMIN — LIDOCAINE HYDROCHLORIDE,EPINEPHRINE BITARTRATE 10 ML: 10; .01 INJECTION, SOLUTION INFILTRATION; PERINEURAL at 12:54

## 2019-11-27 NOTE — ED NOTES
No questions from pt or father regarding discharge instructions, prescriptions, or follow up care.  Pt left ed via wheelchair with father, no distress noted.  All belongings with pt.

## 2019-11-27 NOTE — ED PROVIDER NOTES
ED Provider Note    Scribed for Cole Dunbar M.D. by Lev Salter. 11/27/2019  10:54 AM    Primary care provider: Nica Koch M.D.  Means of arrival: Ambulance  History obtained from: Patient  History limited by: None    CHIEF COMPLAINT  Chief Complaint   Patient presents with   • Syncope       HPI  Simone Euceda is a 29 y.o. male, with history of hypertension and alcohol abuse, who presents to the Emergency Department by ambulance for evaluation of a syncopal episode onset prior to arrival. 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 He sustained a laceration to the back of his head. He reports additional symptoms of shakiness, but denies chest pain, shortness of breath, numbness, tingling, weakness, nausea, or vomiting. He notes he drank 2 beers last night, which he has been cutting back from a 6 pack a night for 2 weeks. He however states he drank heavily this past weekend. He denies drinking hard alcohol. He denies history of syncope. He does not recall when he last received a tetanus vaccination. He denies taking any blood thinners.     REVIEW OF SYSTEMS  Pertinent positives include syncopal episode, laceration to the back of his head, and shakiness. Pertinent negatives include no chest pain, shortness of breath, numbness, tingling, weakness, nausea, or vomiting. All other systems reviewed and negative.    PAST MEDICAL HISTORY   has a past medical history of Alcohol use, Coughing blood, Hypertension, and Unspecified hemorrhagic conditions.    SURGICAL HISTORY   has a past surgical history that includes lymph node excision (Right, 2003) and bronchoscopy-endo (N/A, 10/6/2015).    SOCIAL HISTORY  Social History     Tobacco Use   • Smoking status: Current Every Day Smoker     Packs/day: 0.50     Years: 8.00     Pack years: 4.00     Types: Cigarettes   • Smokeless tobacco: Former User     Types:  "Chew     Quit date: 06/2018   Substance Use Topics   • Alcohol use: Yes     Alcohol/week: 19.2 - 19.8 oz     Types: 4 - 5 Cans of beer, 28 Standard drinks or equivalent per week   • Drug use: Yes     Frequency: 4.0 times per week     Types: Marijuana, Oral     Comment: smoked      Social History     Substance and Sexual Activity   Drug Use Yes   • Frequency: 4.0 times per week   • Types: Marijuana, Oral    Comment: smoked       FAMILY HISTORY  Family History   Problem Relation Age of Onset   • Cancer Mother         bone   • Prostate cancer Maternal Grandfather    • Hypertension Father    • Hyperlipidemia Father    • Stroke Unknown    • No Known Problems Sister    • Cancer Brother         leukemia       CURRENT MEDICATIONS  Current Outpatient Medications:   •  indomethacin (INDOCIN) 50 MG Cap, Take 1 Cap by mouth 3 times a day., Disp: 60 Cap, Rfl: 0  •  allopurinol (ZYLOPRIM) 300 MG Tab, Take 1 Tab by mouth every day., Disp: 90 Tab, Rfl: 3  •  colchicine (COLCRYS) 0.6 MG Tab, Take 1 Tab by mouth every day., Disp: 30 Tab, Rfl: 11     ALLERGIES  Allergies   Allergen Reactions   • Augmentin      erythemia multiform       PHYSICAL EXAM  VITAL SIGNS: /95   Pulse (!) 111   Temp 37.1 °C (98.7 °F) (Temporal)   Resp 16   Ht 1.778 m (5' 10\")   Wt 81.6 kg (180 lb)   BMI 25.83 kg/m²     Constitutional: Well developed, Well nourished, no acute distress, Non-toxic appearance.   HENT: T shaped laceration approximately 4-5 cm on posterior aspect of the head. Normocephalic, Bilateral external ears normal, Oropharynx moist, No oral exudates.   Eyes: PERRLA, EOMI, Conjunctiva normal, No discharge.   Neck: No tenderness, Supple, No stridor.   Lymphatic: No lymphadenopathy noted.   Cardiovascular: Borderline tachycardic heart rate, Normal rhythm.   Thorax & Lungs: Clear to auscultation bilaterally, No respiratory distress, No wheezing, No crackles.   Abdomen: Soft, No tenderness, No masses, No pulsatile masses.   Skin: Warm, " Dry, No erythema, No rash.   Extremities:, No edema No cyanosis.   Musculoskeletal: No tenderness to palpation or major deformities noted.  Intact distal pulses  Neurologic: Tremulous. Awake, alert. Moves all extremities spontaneously.  Psychiatric: Affect normal, Judgment normal, Mood normal.     LABS  Results for orders placed or performed during the hospital encounter of 11/27/19   Blood Alcohol   Result Value Ref Range    Diagnostic Alcohol 0.00 0.00 g/dL   CBC WITH DIFFERENTIAL   Result Value Ref Range    WBC 7.3 4.8 - 10.8 K/uL    RBC 4.80 4.70 - 6.10 M/uL    Hemoglobin 16.8 14.0 - 18.0 g/dL    Hematocrit 47.4 42.0 - 52.0 %    MCV 98.8 (H) 81.4 - 97.8 fL    MCH 35.0 (H) 27.0 - 33.0 pg    MCHC 35.4 (H) 33.7 - 35.3 g/dL    RDW 49.8 35.9 - 50.0 fL    Platelet Count 76 (L) 164 - 446 K/uL    MPV 10.3 9.0 - 12.9 fL    Neutrophils-Polys 79.20 (H) 44.00 - 72.00 %    Lymphocytes 8.50 (L) 22.00 - 41.00 %    Monocytes 10.70 0.00 - 13.40 %    Eosinophils 0.00 0.00 - 6.90 %    Basophils 1.10 0.00 - 1.80 %    Immature Granulocytes 0.50 0.00 - 0.90 %    Nucleated RBC 0.00 /100 WBC    Neutrophils (Absolute) 5.79 1.82 - 7.42 K/uL    Lymphs (Absolute) 0.62 (L) 1.00 - 4.80 K/uL    Monos (Absolute) 0.78 0.00 - 0.85 K/uL    Eos (Absolute) 0.00 0.00 - 0.51 K/uL    Baso (Absolute) 0.08 0.00 - 0.12 K/uL    Immature Granulocytes (abs) 0.04 0.00 - 0.11 K/uL    NRBC (Absolute) 0.00 K/uL   COMP METABOLIC PANEL   Result Value Ref Range    Sodium 135 135 - 145 mmol/L    Potassium 4.3 3.6 - 5.5 mmol/L    Chloride 97 96 - 112 mmol/L    Co2 20 20 - 33 mmol/L    Anion Gap 18.0 (H) 0.0 - 11.9    Glucose 150 (H) 65 - 99 mg/dL    Bun 7 (L) 8 - 22 mg/dL    Creatinine 0.74 0.50 - 1.40 mg/dL    Calcium 9.5 8.5 - 10.5 mg/dL    AST(SGOT) 174 (H) 12 - 45 U/L    ALT(SGPT) 91 (H) 2 - 50 U/L    Alkaline Phosphatase 96 30 - 99 U/L    Total Bilirubin 2.0 (H) 0.1 - 1.5 mg/dL    Albumin 5.0 (H) 3.2 - 4.9 g/dL    Total Protein 7.4 6.0 - 8.2 g/dL    Globulin  2.4 1.9 - 3.5 g/dL    A-G Ratio 2.1 g/dL   LIPASE   Result Value Ref Range    Lipase 127 (H) 11 - 82 U/L   ESTIMATED GFR   Result Value Ref Range    GFR If African American >60 >60 mL/min/1.73 m 2    GFR If Non African American >60 >60 mL/min/1.73 m 2   EKG   Result Value Ref Range    Report       [facility] Emergency Dept.    Test Date:  2019  Pt Name:    CYRUS CALDWELL                Department: ER  MRN:        [id number]                      Room:       BL 15  Gender:     Male                         Technician: [id number]  :        1990                   Requested By:ER TRIAGE PROTOCOL  Order #:    [id number]                    Reading MD: SRAVANI SWAN MD    Measurements  Intervals                                Axis  Rate:       109                          P:          43  NV:         156                          QRS:        240  QRSD:       108                          T:          51  QT:         340  QTc:        458    Interpretive Statements  SINUS TACHYCARDIA  LAD, CONSIDER LEFT ANTERIOR FASCICULAR BLOCK  BASELINE WANDER IN LEAD(S) V3  No previous ECG available for comparison  Electronically Signed On 2019 10:52:50 PST by SRAVANI SWAN MD        Laceration Repair Procedure Note    Indication: Laceration    Procedure: The patient was placed in the appropriate position and anesthesia around the laceration was obtained by infiltration using 1% Lidocaine with epinephrine. The area was then cleansed with betadine and draped in a sterile fashion. The laceration was closed with staples. There were no additional lacerations requiring repair. The wound area was then dressed with a sterile dressing.      Total repaired wound length: 4.5 cm.     Other Items: Staple count: 7    The patient tolerated the procedure well.    Complications: None         COURSE & MEDICAL DECISION MAKING  Pertinent Labs & Imaging studies reviewed. (See chart for details)    I reviewed the patient's  medical records which showed history of hypertension and alcohol abuse.     10:54 AM - Patient seen and examined at bedside. I informed the patient of my plan to run diagnostic studies to evaluate their symptoms including blood work. Patient verbalizes understanding and support with my plan of care. Patient will be treated with NS infusion 1,000 mL, Ativan 1 mg injection. Ordered EKG, Blood alcohol, CBC with diff, CMP, Lipase to evaluate his symptoms. The differential diagnoses include but are not limited to: alcohol withdrawal, vasovagal syncope, dehydration.     1:03 PM - Laceration repair procedure performed by me.     1:12 PM - I reevaluated the patient at bedside. The patient informs me they feel improved following fluid administration. The patient verbalizes they feel comfortable going home. The patient is stable for discharge at this time and will return for any new or worsening symptoms. I discussed plan for discharge and follow up as outlined below. Patient verbalizes understanding and support with my plan for discharge.      Decision Making:  Patient with a syncopal episode with head laceration, the patient appears obviously tremorous, he states he only drinks a sixpack a day and has been cutting down, I believe the patient is likely drinking more alcohol than this.  The patient has increased upper function test, is in obvious alcohol withdrawal.  The patient is hemodynamically stable, improved after IV fluids and Ativan.  We will give the patient a prescription for Librium, Phenergan, patient did vomit prior to discharge, was able to keep things down, will discharge the patient home, I do not believe that this was work comp related    HYDRATION: Based on the patient's presentation of Tachycardia the patient was given IV fluids. IV Hydration was used because oral hydration was not adequate alone. Upon recheck following hydration, the patient was slightly improved.       I reviewed prescription monitoring  program for patient's narcotic use before prescribing a scheduled drug.The patient will not drink alcohol nor drive with prescribed medications. The patient will return for new or worsening symptoms and is stable at the time of discharge.    The patient is referred to a primary physician for blood pressure management, diabetic screening, and for all other preventative health concerns.    In prescribing controlled substances to this patient, I certify that I have obtained and reviewed the medical history of Simone Euceda. I have also made a good keily effort to obtain applicable records from other providers who have treated the patient and no other records are available at this time.     I have conducted a physical exam and documented it. I have reviewed Mr. Euceda’s prescription history as maintained by the [place] Prescription Monitoring Program.     I have assessed the patient’s risk for abuse, dependency, and addiction using the validated Opioid Risk Tool available at https://www.mdcalc.com/khnfhi-haww-lwdc-ort-narcotic-abuse.     Given the above, I believe the benefits of controlled substance therapy outweigh the risks. The reasons for prescribing controlled substances include non-narcotic, oral analgesic alternatives have been inadequate for pain control. Accordingly, I have discussed the risk and benefits, treatment plan, and alternative therapies with the patient.        DISPOSITION:  Patient will be discharged home in stable condition.    FOLLOW UP:  [facility], Emergency Dept  [address] [id number] [phone]    If symptoms worsen      OUTPATIENT MEDICATIONS:  Discharge Medication List as of 11/27/2019  3:17 PM      START taking these medications    Details   chlordiazePOXIDE (LIBRIUM) 25 MG Cap Day 1: 2-4 pills every 6-12 hours  Day 2: 1 pill every 6 hours  Day 3: 1 pill 2 times a day  Day 4: 1 pill at night, Disp-15 Cap, R-0, Print Rx Paper       promethazine (PHENERGAN) 25 MG Tab Take 1 Tab by mouth every 6 hours as needed for Nausea/Vomiting., Disp-30 Tab, R-0, Print Rx Paper              FINAL IMPRESSION  1. Syncope, unspecified syncope type    2. Closed head injury, initial encounter    3. Laceration of scalp, initial encounter    4. Alcohol withdrawal syndrome without complication (HCC)    5.      Laceration repair procedure performed by ERP.        Lev AGGARWAL (Scribe), am scribing for, and in the presence of, Cole Dunbar M.D..    Electronically signed by: Lev Salter (Scribe), 11/27/2019    Cole AGGARWAL M.D. personally performed the services described in this documentation, as scribed by Lev Salter in my presence, and it is both accurate and complete. C.     The note accurately reflects work and decisions made by me.  Cole Dunbar  11/27/2019  3:59 PM

## 2019-11-27 NOTE — LETTER
"  FORM C-4:  EMPLOYEE’S CLAIM FOR COMPENSATION/ REPORT OF INITIAL TREATMENT  EMPLOYEE’S CLAIM - PROVIDE ALL INFORMATION REQUESTED   First Name Simone Last Name [place] Birthdate 1990  Sex male Claim Number   Home Employee Address [address] [place], [place]                                     Zip  [address] Height  1.778 m (5' 10\") Weight  81.6 kg (180 lb) [facility]     [id number]   Mailing Employee Address [address] [place], [place]               Zip  [address] Telephone  [phone] (home)  Primary Language Spoken  English   Insurer  [employer]   WORKERS CHOICE Employee's Occupation (Job Title) When Injury or Occupational Disease Occurred     Employer's Name [employer] Telephone [phone]    Employer Address [address]] Zip [address]   Date of Injury  11/27/2019       Hour of Injury  10:00 AM Date Employer Notified  11/27/2019 Last Day of Work after Injury or Occupational Disease  11/27/2019 Supervisor to Whom Injury Reported     Address or Location of Accident (if applicable) [[address]]   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 Be specific and answer in detail. Use additional sheet if necessary)  PASSED OUT HIT MY HEAD   If you believe that you have an occupational disease, when did you first have knowledge of the disability and it relationship to your employment? NO Witnesses to the Accident  [employer]   Nature of Injury or Occupational Disease  Workers' Compensation Part(s) of Body Injured or Affected  Skull   I CERTIFY THAT THE ABOVE IS TRUE AND CORRECT TO THE BEST OF MY KNOWLEDGE AND THAT I HAVE PROVIDED THIS INFORMATION IN ORDER TO OBTAIN THE BENEFITS OF [place]).  I HEREBY AUTHORIZE ANY PHYSICIAN, CHIROPRACTOR, " SURGEON, PRACTITIONER, OR OTHER PERSON, ANY HOSPITAL, INCLUDING [facility] OR [facility], ANY MEDICAL SERVICE ORGANIZATION, ANY INSURANCE COMPANY, OR OTHER INSTITUTION OR ORGANIZATION TO RELEASE TO EACH OTHER, ANY MEDICAL OR OTHER INFORMATION, INCLUDING BENEFITS PAID OR PAYABLE, PERTINENT TO THIS INJURY OR DISEASE, EXCEPT INFORMATION RELATIVE TO DIAGNOSIS, TREATMENT AND/OR COUNSELING FOR AIDS, PSYCHOLOGICAL CONDITIONS, ALCOHOL OR CONTROLLED SUBSTANCES, FOR WHICH I MUST GIVE SPECIFIC AUTHORIZATION.  A PHOTOSTAT OF THIS AUTHORIZATION SHALL BE AS VALID AS THE ORIGINAL.  Date  11/27/19                               [facility]                                                                     Employee’s Signature   THIS REPORT MUST BE COMPLETED AND MAILED WITHIN 3 WORKING DAYS OF TREATMENT   [facility], EMERGENCY DEPT                                                                             Name of Facility [facility]   Date  11/27/2019 Diagnosis  (R55) Syncope, unspecified syncope type  (S09.90XA) Closed head injury, initial encounter  (S01.01XA) Laceration of scalp, initial encounter  (F10.230) Alcohol withdrawal syndrome without complication (HCC) Is there evidence the injured employee was under the influence of alcohol and/or another controlled substance at the time of accident?   Hour  3:13 PM Description of Injury or Disease  Syncope, unspecified syncope type  Closed head injury, initial encounter  Laceration of scalp, initial encounter  Alcohol withdrawal syndrome without complication (HCC) No   Treatment  Iv fluids, ativan, nausea medicine  Have you advised the patient to remain off work five days or more?         No   X-Ray Findings  Negative If Yes   From Date    To Date      From information given by the employee, together with medical evidence, can you directly connect this injury or occupational disease as job incurred? No If No, is  "employee capable of: Full Duty  Yes Modified Duty      Is additional medical care by a physician indicated? Yes If Modified Duty, Specify any Limitations / Restrictions       Do you know of any previous injury or disease contributing to this condition or occupational disease?   Comments:alcoholism    Date 11/27/2019 Print Doctor’s Name Cole Swan I certify the employer’s copy of this form was mailed on:   Address [address] [place] [id number] [phone] INSURER’S USE ONLY   Provider’s Tax ID Number [id number] Telephone Dept: [phone]    Doctor’s Signature e-SignCOLE SWAN M.D. Degree  MD      Form C-4 (rev.10/07)                                                             BRIEF DESCRIPTION OF RIGHTS AND BENEFITS  (Pursuant to [place] 616[place])    Notice of Injury or Occupational Disease (Incident Report Form C-1): If an injury or occupational disease (OD) arises out of and in the course of employment, you must provide written notice to your employer as soon as practicable, but no later than 7 days after the accident or OD. Your employer shall maintain a sufficient supply of the required forms.    Claim for Compensation (Form C-4): If medical treatment is sought, the form C-4 is available at the place of initial treatment. A completed \"Claim for Compensation\" (Form C-4) must be filed within 90 days after an accident or OD. The treating physician or chiropractor must, within 3 working days after treatment, complete and mail to the employer, the employer's insurer and third-party , the Claim for Compensation.    Medical Treatment: If you require medical treatment for your on-the-job injury or OD, you may be required to select a physician or chiropractor from a list provided by your workers’ compensation insurer, if it has contracted with an Organization for Managed Care (MCO) or Preferred Provider Organization (PPO) or providers of health care. If your employer has not entered " into a contract with an MCO or PPO, you may select a physician or chiropractor from the Panel of Physicians and Chiropractors. Any medical costs related to your industrial injury or OD will be paid by your insurer.    Temporary Total Disability (TTD): If your doctor has certified that you are unable to work for a period of at least 5 consecutive days, or 5 cumulative days in a 20-day period, or places restrictions on you that your employer does not accommodate, you may be entitled to TTD compensation.    Temporary Partial Disability (TPD): If the wage you receive upon reemployment is less than the compensation for TTD to which you are entitled, the insurer may be required to pay you TPD compensation to make up the difference. TPD can only be paid for a maximum of 24 months.    Permanent Partial Disability (PPD): When your medical condition is stable and there is an indication of a PPD as a result of your injury or OD, within 30 days, your insurer must arrange for an evaluation by a rating physician or chiropractor to determine the degree of your PPD. The amount of your PPD award depends on the date of injury, the results of the PPD evaluation and your age and wage.    Permanent Total Disability (PTD): If you are medically certified by a treating physician or chiropractor as permanently and totally disabled and have been granted a PTD status by your insurer, you are entitled to receive monthly benefits not to exceed 66 2/3% of your average monthly wage. The amount of your PTD payments is subject to reduction if you previously received a PPD award.    Vocational Rehabilitation Services: You may be eligible for vocational rehabilitation services if you are unable to return to the job due to a permanent physical impairment or permanent restrictions as a result of your injury or occupational disease.    Transportation and Per Cecilio Reimbursement: You may be eligible for travel expenses and per cecilio associated with medical  treatment.    Reopening: You may be able to reopen your claim if your condition worsens after claim closure.     Appeal Process: If you disagree with a written determination issued by the insurer or the insurer does not respond to your request, you may appeal to the Department of Administration, , by following the instructions contained in your determination letter. You must appeal the determination within 70 days from the date of the determination letter at [address], [address], [place], [place] [address], or [address], [place], [place] [address]. If you disagree with the  decision, you may appeal to the Department of Administration, . You must file your appeal within 30 days from the date of the  decision letter at [address], [address], [place], [place] [address], or [address], [place], [place] [address]. If you disagree with a decision of an , you may file a petition for judicial review with the District Court. You must do so within 30 days of the Appeal Officer’s decision. You may be represented by an  at your own expense or you may contact the [facility] for possible representation.    [place]  for Injured Workers (NAIW): If you disagree with a  decision, you may request that NAIW represent you without charge at an  Hearing. For information regarding denial of benefits, you may contact the [facility] at: [address], [address], [place] [address], [phone], or [address], [address], [place] [address], [phone]    To File a Complaint with the Division: If you wish to file a complaint with the  of the Division of Industrial Relations (DIR),  please contact the Workers’ Compensation Section, [address], [address], [place], [place] [address], telephone [phone], or [address], [address], [place], [place] [address], telephone [phone].    For assistance with Workers’ Compensation Issues: You may contact the Office of the Governor Consumer Health Assistance, [address] [place] [address], [address], [place], [place] [address], Toll Free 1-907.324.9357, Web site: [email or web address]., E-mail [email or web address].  D-2 (rev. 06/18)              __________________________________________________________________                                    ____11/27/19_____________            Employee Name / Signature                                                                                                                            Date

## 2019-11-27 NOTE — ED NOTES
md notified of pt's vomiting.  Did not attempt to give scanned librium and phenergan in pt's current condition.  Iv meds ordered by md then pt to take po meds once able to tolerate.

## 2019-11-27 NOTE — ED TRIAGE NOTES
Pt at work when he had a syncopal episode resulting in approximate 3inch lac to posterior section of head.  Wound dressed by ems pta.  No hx of syncope. Pt unaware of month, alert otherwise.

## 2020-05-08 ENCOUNTER — TELEPHONE (OUTPATIENT)
Dept: MEDICAL GROUP | Facility: PHYSICIAN GROUP | Age: 30
End: 2020-05-08

## 2020-05-08 ENCOUNTER — APPOINTMENT (OUTPATIENT)
Dept: MEDICAL GROUP | Facility: PHYSICIAN GROUP | Age: 30
End: 2020-05-08
Payer: COMMERCIAL

## 2020-05-08 NOTE — DISCHARGE PLANNING
Pt called in requesting for ERP to sign his unemployment form. Explained to pt that ERP is unable to sign as well as RN CM.     A few minutes later, the pt reported to Yord[facility] Timmons and rico said that pt is requesting for a work release note. During chart review, CM discovered that pt was seen in November 2019. CM explained that unable to write a work release to back track 6 months ago.

## 2020-05-08 NOTE — TELEPHONE ENCOUNTER
I have called patient to let him know that Dr. Koch I unable to fill out his unemployment paperwork about his ER visit back in 2019. As he did not follow up with her in proper time. He understood. I have cancelled patients appointment.

## 2022-04-18 ENCOUNTER — OFFICE VISIT (OUTPATIENT)
Dept: URGENT CARE | Facility: CLINIC | Age: 32
End: 2022-04-18
Payer: MEDICAID

## 2022-04-18 ENCOUNTER — HOSPITAL ENCOUNTER (OUTPATIENT)
Facility: MEDICAL CENTER | Age: 32
End: 2022-04-18
Attending: PHYSICIAN ASSISTANT
Payer: MEDICAID

## 2022-04-18 VITALS
DIASTOLIC BLOOD PRESSURE: 80 MMHG | HEART RATE: 100 BPM | HEIGHT: 70 IN | BODY MASS INDEX: 25.34 KG/M2 | TEMPERATURE: 99.7 F | SYSTOLIC BLOOD PRESSURE: 120 MMHG | OXYGEN SATURATION: 95 % | WEIGHT: 177 LBS | RESPIRATION RATE: 16 BRPM

## 2022-04-18 DIAGNOSIS — J06.9 VIRAL URI WITH COUGH: ICD-10-CM

## 2022-04-18 DIAGNOSIS — J02.9 SORE THROAT: ICD-10-CM

## 2022-04-18 LAB
EXTERNAL QUALITY CONTROL: NORMAL
INT CON NEG: NEGATIVE
INT CON POS: POSITIVE
S PYO AG THROAT QL: NEGATIVE
SARS-COV+SARS-COV-2 AG RESP QL IA.RAPID: NEGATIVE

## 2022-04-18 PROCEDURE — 87426 SARSCOV CORONAVIRUS AG IA: CPT | Performed by: PHYSICIAN ASSISTANT

## 2022-04-18 PROCEDURE — U0005 INFEC AGEN DETEC AMPLI PROBE: HCPCS

## 2022-04-18 PROCEDURE — 99203 OFFICE O/P NEW LOW 30 MIN: CPT | Performed by: PHYSICIAN ASSISTANT

## 2022-04-18 PROCEDURE — 87880 STREP A ASSAY W/OPTIC: CPT | Performed by: PHYSICIAN ASSISTANT

## 2022-04-18 PROCEDURE — U0003 INFECTIOUS AGENT DETECTION BY NUCLEIC ACID (DNA OR RNA); SEVERE ACUTE RESPIRATORY SYNDROME CORONAVIRUS 2 (SARS-COV-2) (CORONAVIRUS DISEASE [COVID-19]), AMPLIFIED PROBE TECHNIQUE, MAKING USE OF HIGH THROUGHPUT TECHNOLOGIES AS DESCRIBED BY CMS-2020-01-R: HCPCS

## 2022-04-18 ASSESSMENT — ENCOUNTER SYMPTOMS
COUGH: 1
DIARRHEA: 0
HEADACHES: 1
VOMITING: 0

## 2022-04-18 NOTE — PROGRESS NOTES
Subjective:   Simone Euceda is a 32 y.o. male who presents for Pharyngitis (Yellow mucus, body aches  x 1 day Requesting Rapid Covid for living situation )        Pharyngitis   This is a new problem. The current episode started yesterday. The problem has been gradually worsening. Neither side of throat is experiencing more pain than the other. There has been no fever. The pain is moderate. Associated symptoms include congestion, coughing and headaches. Pertinent negatives include no diarrhea, ear pain or vomiting. He has had no exposure to strep or mono. He has tried nothing for the symptoms. The treatment provided no relief.     Review of Systems   HENT: Positive for congestion. Negative for ear pain.    Respiratory: Positive for cough.    Gastrointestinal: Negative for diarrhea and vomiting.   Neurological: Positive for headaches.       PMH:  has a past medical history of Alcohol use, Coughing blood, Hypertension, and Unspecified hemorrhagic conditions.    He has no past medical history of Clotting disorder (HCC).  MEDS:   Current Outpatient Medications:   •  promethazine (PHENERGAN) 25 MG Tab, Take 1 Tab by mouth every 6 hours as needed for Nausea/Vomiting. (Patient not taking: Reported on 4/18/2022), Disp: 30 Tab, Rfl: 0  •  indomethacin (INDOCIN) 50 MG Cap, Take 1 Cap by mouth 3 times a day. (Patient not taking: Reported on 4/18/2022), Disp: 60 Cap, Rfl: 0  •  allopurinol (ZYLOPRIM) 300 MG Tab, Take 1 Tab by mouth every day. (Patient not taking: Reported on 4/18/2022), Disp: 90 Tab, Rfl: 3  •  colchicine (COLCRYS) 0.6 MG Tab, Take 1 Tab by mouth every day. (Patient not taking: Reported on 4/18/2022), Disp: 30 Tab, Rfl: 11  ALLERGIES:   Allergies   Allergen Reactions   • Augmentin      erythemia multiform     SURGHX:   Past Surgical History:   Procedure Laterality Date   • BRONCHOSCOPY-ENDO N/A 10/6/2015    Procedure: BRONCHOSCOPY-ENDO;  Surgeon: Inder Cardenas M.D.;  Location: SURGERY [facility]  "ORS;  Service:    • LYMPH NODE EXCISION Right 2003    right lymph node removal 2003, benign enlarged lymph node     SOCHX:  reports that he has been smoking cigarettes. He has a 4.00 pack-year smoking history. He quit smokeless tobacco use about 3 years ago.  His smokeless tobacco use included chew. He reports current alcohol use of about 19.2 - 19.8 oz of alcohol per week. He reports current drug use. Frequency: 4.00 times per week. Drugs: Marijuana and Oral.  FH: Family history was reviewed, no pertinent findings to report   Objective:   /80   Pulse 100   Temp 37.6 °C (99.7 °F)   Resp 16   Ht 1.778 m (5' 10\")   Wt 80.3 kg (177 lb)   SpO2 95%   BMI 25.40 kg/m²   Physical Exam  Vitals reviewed.   Constitutional:       General: He is not in acute distress.     Appearance: Normal appearance. He is well-developed. He is not toxic-appearing.   HENT:      Head: Normocephalic and atraumatic.      Right Ear: External ear normal.      Left Ear: External ear normal.      Nose: Congestion and rhinorrhea present. Rhinorrhea is clear.      Mouth/Throat:      Lips: Pink.      Mouth: Mucous membranes are moist.      Pharynx: Oropharynx is clear. Uvula midline. Posterior oropharyngeal erythema present.   Cardiovascular:      Rate and Rhythm: Normal rate and regular rhythm.      Heart sounds: Normal heart sounds, S1 normal and S2 normal.   Pulmonary:      Effort: Pulmonary effort is normal. No respiratory distress.      Breath sounds: Normal breath sounds. No stridor. No decreased breath sounds, wheezing, rhonchi or rales.   Skin:     General: Skin is dry.   Neurological:      Comments: Alert and oriented.    Psychiatric:         Speech: Speech normal.         Behavior: Behavior normal.           Assessment/Plan:   1. Viral URI with cough  - SARS-CoV-2 PCR (24 hour In-House): Collect NP swab in VTM; Future    2. Sore throat  - POCT SARS-COV Antigen OZZY (Symptomatic only)  - POCT Rapid Strep A    Discussed with patient " signs and symptoms most likely are due to a viral etiology.     Test for COVID-19. We will call/message back for results and appropriate further instructions. Instructed to sign up for MyChart if they have not already. Result will be released to [employer] application.   Symptomatic and supportive care:   Plenty of oral hydration and rest   Over the counter cough suppressant as directed.  Tylenol or ibuprofen for pain and fever as directed.   Saline nasal spray and Flonase as a decongestant.   Infection control measures at home. Stay away from people, Hand washing, covering sneeze/cough, disinfect surfaces.   Remain home from work, school, and other populated environments.    Differential diagnosis, natural history, supportive care, and indications for immediate follow-up discussed.

## 2022-04-19 DIAGNOSIS — J06.9 VIRAL URI WITH COUGH: ICD-10-CM

## 2022-04-19 LAB
COVID ORDER STATUS COVID19: NORMAL
SARS-COV-2 RNA RESP QL NAA+PROBE: NOTDETECTED
SPECIMEN SOURCE: NORMAL

## 2023-04-20 ENCOUNTER — HOSPITAL ENCOUNTER (EMERGENCY)
Facility: MEDICAL CENTER | Age: 33
End: 2023-04-20
Attending: EMERGENCY MEDICINE

## 2023-04-20 VITALS
TEMPERATURE: 97.1 F | BODY MASS INDEX: 22.9 KG/M2 | HEART RATE: 67 BPM | RESPIRATION RATE: 18 BRPM | HEIGHT: 70 IN | DIASTOLIC BLOOD PRESSURE: 88 MMHG | WEIGHT: 160 LBS | SYSTOLIC BLOOD PRESSURE: 127 MMHG | OXYGEN SATURATION: 97 %
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
## 2023-04-20 DIAGNOSIS — Z00.8 MEDICAL CLEARANCE FOR INCARCERATION: ICD-10-CM

## 2023-04-20 PROCEDURE — 307740 HCHG GREEN TRAUMA TEAM SERVICES

## 2023-04-20 PROCEDURE — 99284 EMERGENCY DEPT VISIT MOD MDM: CPT

## 2023-04-20 NOTE — DISCHARGE INSTRUCTIONS
Patient is medically cleared for incarceration    Your exam today was incredibly reassuring, if you develop significant neck pain, severe headache, multiple episodes of vomiting or severe pain otherwise please return to the emergency department.

## 2023-04-20 NOTE — ED NOTES
"Pt discharged with NHP. Pt A&Ox4, on room air, steady gait. pt in possession of belongings. Pt provided discharge education and information pertaining to medications and follow up appointments. Pt received copy of discharge instructions and verbalized understanding. /88   Pulse 67   Temp 36.2 °C (97.1 °F) (Temporal)   Resp 18   Ht 1.778 m (5' 10\")   Wt 72.6 kg (160 lb)   SpO2 97%   BMI 22.96 kg/m²     "
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
